# Patient Record
Sex: MALE | Race: BLACK OR AFRICAN AMERICAN | Employment: OTHER | URBAN - METROPOLITAN AREA
[De-identification: names, ages, dates, MRNs, and addresses within clinical notes are randomized per-mention and may not be internally consistent; named-entity substitution may affect disease eponyms.]

---

## 2019-04-16 ENCOUNTER — APPOINTMENT (OUTPATIENT)
Dept: GENERAL RADIOLOGY | Age: 84
End: 2019-04-16
Payer: MEDICARE

## 2019-04-16 ENCOUNTER — HOSPITAL ENCOUNTER (EMERGENCY)
Age: 84
Discharge: HOME | End: 2019-04-16
Payer: MEDICARE

## 2019-04-16 VITALS
RESPIRATION RATE: 18 BRPM | HEIGHT: 69 IN | WEIGHT: 130 LBS | TEMPERATURE: 98.4 F | BODY MASS INDEX: 19.26 KG/M2 | HEART RATE: 96 BPM | SYSTOLIC BLOOD PRESSURE: 116 MMHG | OXYGEN SATURATION: 98 % | DIASTOLIC BLOOD PRESSURE: 75 MMHG

## 2019-04-16 DIAGNOSIS — J18.9 PNEUMONIA DUE TO ORGANISM: Primary | ICD-10-CM

## 2019-04-16 LAB
ALBUMIN SERPL-MCNC: 1.8 G/DL (ref 3.5–4.6)
ALP BLD-CCNC: 65 U/L (ref 35–104)
ALT SERPL-CCNC: 68 U/L (ref 0–41)
ANION GAP SERPL CALCULATED.3IONS-SCNC: 12 MEQ/L (ref 9–15)
APTT: 24.3 SEC (ref 21.6–35.4)
AST SERPL-CCNC: 42 U/L (ref 0–40)
BASE EXCESS ARTERIAL: -3 (ref -3–3)
BASOPHILS ABSOLUTE: 0 K/UL (ref 0–0.2)
BASOPHILS RELATIVE PERCENT: 0.3 %
BILIRUB SERPL-MCNC: 0.7 MG/DL (ref 0.2–0.7)
BUN BLDV-MCNC: 36 MG/DL (ref 8–23)
CALCIUM IONIZED: 1.51 MMOL/L (ref 1.12–1.32)
CALCIUM SERPL-MCNC: 9.5 MG/DL (ref 8.5–9.9)
CHLORIDE BLD-SCNC: 117 MEQ/L (ref 95–107)
CO2: 22 MEQ/L (ref 20–31)
CREAT SERPL-MCNC: 0.76 MG/DL (ref 0.7–1.2)
EKG ATRIAL RATE: 87 BPM
EKG P AXIS: 82 DEGREES
EKG P-R INTERVAL: 170 MS
EKG Q-T INTERVAL: 370 MS
EKG QRS DURATION: 70 MS
EKG QTC CALCULATION (BAZETT): 445 MS
EKG R AXIS: -16 DEGREES
EKG T AXIS: -2 DEGREES
EKG VENTRICULAR RATE: 87 BPM
EOSINOPHILS ABSOLUTE: 0 K/UL (ref 0–0.7)
EOSINOPHILS RELATIVE PERCENT: 0.3 %
GFR AFRICAN AMERICAN: >60
GFR AFRICAN AMERICAN: >60
GFR NON-AFRICAN AMERICAN: >60
GFR NON-AFRICAN AMERICAN: >60
GLOBULIN: 7 G/DL (ref 2.3–3.5)
GLUCOSE BLD-MCNC: 117 MG/DL (ref 70–99)
GLUCOSE BLD-MCNC: 137 MG/DL (ref 60–115)
HCO3 ARTERIAL: 21 MMOL/L (ref 21–29)
HCT VFR BLD CALC: 24.9 % (ref 42–52)
HEMOGLOBIN: 7.7 G/DL (ref 14–18)
HEMOGLOBIN: 8.2 GM/DL (ref 13.5–17.5)
INR BLD: 1.2
LACTATE: 1.01 MMOL/L (ref 0.4–2)
LACTIC ACID: 2.8 MMOL/L (ref 0.5–2.2)
LACTIC ACID: 3.1 MMOL/L (ref 0.5–2.2)
LYMPHOCYTES ABSOLUTE: 1.6 K/UL (ref 1–4.8)
LYMPHOCYTES RELATIVE PERCENT: 13.8 %
MCH RBC QN AUTO: 29.7 PG (ref 27–31.3)
MCHC RBC AUTO-ENTMCNC: 30.8 % (ref 33–37)
MCV RBC AUTO: 96.5 FL (ref 80–100)
MONOCYTES ABSOLUTE: 0.9 K/UL (ref 0.2–0.8)
MONOCYTES RELATIVE PERCENT: 8.1 %
NEUTROPHILS ABSOLUTE: 8.9 K/UL (ref 1.4–6.5)
NEUTROPHILS RELATIVE PERCENT: 77.5 %
O2 SAT, ARTERIAL: 93 % (ref 93–100)
PCO2 ARTERIAL: 28 MM HG (ref 35–45)
PDW BLD-RTO: 19.8 % (ref 11.5–14.5)
PERFORMED ON: ABNORMAL
PH ARTERIAL: 7.49 (ref 7.35–7.45)
PLATELET # BLD: 253 K/UL (ref 130–400)
PO2 ARTERIAL: 61 MM HG (ref 75–108)
POC CHLORIDE: 128 MEQ/L (ref 99–110)
POC CREATININE: 0.9 MG/DL (ref 0.8–1.3)
POC HEMATOCRIT: 24 % (ref 41–53)
POC POTASSIUM: 3.6 MEQ/L (ref 3.5–5.1)
POC SAMPLE TYPE: ABNORMAL
POC SODIUM: 160 MEQ/L (ref 136–145)
POTASSIUM SERPL-SCNC: 3.7 MEQ/L (ref 3.4–4.9)
PROTHROMBIN TIME: 12 SEC (ref 9–11.5)
RBC # BLD: 2.58 M/UL (ref 4.7–6.1)
REASON FOR REJECTION: NORMAL
REJECTED TEST: NORMAL
SODIUM BLD-SCNC: 151 MEQ/L (ref 135–144)
TCO2 ARTERIAL: 22 (ref 22–29)
TOTAL CK: 18 U/L (ref 0–190)
TOTAL PROTEIN: 8.8 G/DL (ref 6.3–8)
TROPONIN: <0.01 NG/ML (ref 0–0.01)
WBC # BLD: 11.5 K/UL (ref 4.8–10.8)

## 2019-04-16 PROCEDURE — 71045 X-RAY EXAM CHEST 1 VIEW: CPT

## 2019-04-16 PROCEDURE — 85610 PROTHROMBIN TIME: CPT

## 2019-04-16 PROCEDURE — 85014 HEMATOCRIT: CPT

## 2019-04-16 PROCEDURE — 82803 BLOOD GASES ANY COMBINATION: CPT

## 2019-04-16 PROCEDURE — 82435 ASSAY OF BLOOD CHLORIDE: CPT

## 2019-04-16 PROCEDURE — 84295 ASSAY OF SERUM SODIUM: CPT

## 2019-04-16 PROCEDURE — 87040 BLOOD CULTURE FOR BACTERIA: CPT

## 2019-04-16 PROCEDURE — 82330 ASSAY OF CALCIUM: CPT

## 2019-04-16 PROCEDURE — 2580000003 HC RX 258: Performed by: PHYSICIAN ASSISTANT

## 2019-04-16 PROCEDURE — 94640 AIRWAY INHALATION TREATMENT: CPT

## 2019-04-16 PROCEDURE — 93005 ELECTROCARDIOGRAM TRACING: CPT

## 2019-04-16 PROCEDURE — 80053 COMPREHEN METABOLIC PANEL: CPT

## 2019-04-16 PROCEDURE — 83605 ASSAY OF LACTIC ACID: CPT

## 2019-04-16 PROCEDURE — 82565 ASSAY OF CREATININE: CPT

## 2019-04-16 PROCEDURE — 82550 ASSAY OF CK (CPK): CPT

## 2019-04-16 PROCEDURE — 99284 EMERGENCY DEPT VISIT MOD MDM: CPT

## 2019-04-16 PROCEDURE — 6370000000 HC RX 637 (ALT 250 FOR IP): Performed by: PHYSICIAN ASSISTANT

## 2019-04-16 PROCEDURE — 85025 COMPLETE CBC W/AUTO DIFF WBC: CPT

## 2019-04-16 PROCEDURE — 84132 ASSAY OF SERUM POTASSIUM: CPT

## 2019-04-16 PROCEDURE — 84484 ASSAY OF TROPONIN QUANT: CPT

## 2019-04-16 PROCEDURE — 36415 COLL VENOUS BLD VENIPUNCTURE: CPT

## 2019-04-16 PROCEDURE — 85730 THROMBOPLASTIN TIME PARTIAL: CPT

## 2019-04-16 PROCEDURE — 36600 WITHDRAWAL OF ARTERIAL BLOOD: CPT

## 2019-04-16 RX ORDER — FERROUS SULFATE 325(65) MG
325 TABLET ORAL 2 TIMES DAILY
Status: ON HOLD | COMMUNITY
End: 2019-08-02 | Stop reason: HOSPADM

## 2019-04-16 RX ORDER — BRIMONIDINE TARTRATE 2 MG/ML
1 SOLUTION/ DROPS OPHTHALMIC 2 TIMES DAILY
COMMUNITY

## 2019-04-16 RX ORDER — ACETAMINOPHEN 325 MG/1
650 TABLET ORAL EVERY 4 HOURS PRN
COMMUNITY
End: 2019-08-02

## 2019-04-16 RX ORDER — DORZOLAMIDE HYDROCHLORIDE AND TIMOLOL MALEATE 20; 5 MG/ML; MG/ML
1 SOLUTION/ DROPS OPHTHALMIC 2 TIMES DAILY
COMMUNITY

## 2019-04-16 RX ORDER — LEVOTHYROXINE SODIUM 88 UG/1
88 TABLET ORAL DAILY
COMMUNITY

## 2019-04-16 RX ORDER — AMLODIPINE BESYLATE 10 MG/1
10 TABLET ORAL DAILY
COMMUNITY

## 2019-04-16 RX ORDER — IPRATROPIUM BROMIDE AND ALBUTEROL SULFATE 2.5; .5 MG/3ML; MG/3ML
1 SOLUTION RESPIRATORY (INHALATION)
Status: DISCONTINUED | OUTPATIENT
Start: 2019-04-16 | End: 2019-04-16 | Stop reason: HOSPADM

## 2019-04-16 RX ORDER — CLONIDINE 0.1 MG/24H
1 PATCH, EXTENDED RELEASE TRANSDERMAL WEEKLY
Status: ON HOLD | COMMUNITY
End: 2019-08-02

## 2019-04-16 RX ORDER — 0.9 % SODIUM CHLORIDE 0.9 %
1000 INTRAVENOUS SOLUTION INTRAVENOUS ONCE
Status: COMPLETED | OUTPATIENT
Start: 2019-04-16 | End: 2019-04-16

## 2019-04-16 RX ORDER — LOSARTAN POTASSIUM 100 MG/1
100 TABLET ORAL DAILY
Status: ON HOLD | COMMUNITY
End: 2019-08-02 | Stop reason: HOSPADM

## 2019-04-16 RX ORDER — IPRATROPIUM BROMIDE AND ALBUTEROL SULFATE 2.5; .5 MG/3ML; MG/3ML
1 SOLUTION RESPIRATORY (INHALATION) 3 TIMES DAILY PRN
Status: ON HOLD | COMMUNITY
End: 2019-08-02 | Stop reason: HOSPADM

## 2019-04-16 RX ORDER — LATANOPROST 50 UG/ML
1 SOLUTION/ DROPS OPHTHALMIC NIGHTLY
COMMUNITY

## 2019-04-16 RX ORDER — MIRTAZAPINE 15 MG/1
15 TABLET, FILM COATED ORAL NIGHTLY
COMMUNITY

## 2019-04-16 RX ORDER — DONEPEZIL HYDROCHLORIDE 10 MG/1
10 TABLET, FILM COATED ORAL NIGHTLY
COMMUNITY

## 2019-04-16 RX ADMIN — IPRATROPIUM BROMIDE AND ALBUTEROL SULFATE 1 AMPULE: .5; 3 SOLUTION RESPIRATORY (INHALATION) at 10:48

## 2019-04-16 RX ADMIN — SODIUM CHLORIDE 1000 ML: 9 INJECTION, SOLUTION INTRAVENOUS at 10:41

## 2019-04-16 SDOH — HEALTH STABILITY: MENTAL HEALTH: HOW OFTEN DO YOU HAVE A DRINK CONTAINING ALCOHOL?: NEVER

## 2019-04-16 ASSESSMENT — ENCOUNTER SYMPTOMS
NAUSEA: 0
COUGH: 1
SORE THROAT: 0
BACK PAIN: 0
CONSTIPATION: 0
EYE DISCHARGE: 0
VOMITING: 0
STRIDOR: 0
ABDOMINAL PAIN: 0
SHORTNESS OF BREATH: 0
ABDOMINAL DISTENTION: 0
WHEEZING: 0
COLOR CHANGE: 0
RHINORRHEA: 0
DIARRHEA: 0

## 2019-04-16 NOTE — ED PROVIDER NOTES
[facility] ED  eMERGENCY dEPARTMENT eNCOUnter      Pt Name: Venice Ross  MRN: [id number]  [place] 3/2/1928  Date of evaluation: 4/16/2019  Provider: Zenaida Thomas PA-C    CHIEF COMPLAINT       Chief Complaint   Patient presents with    Other     facility states o2 sat 66% 100% on r/a on arrival         HISTORY OF PRESENT ILLNESS   (Location/Symptom, Timing/Onset,Context/Setting, Quality, Duration, Modifying Factors, Severity)  Note limiting factors. Venice Ross is a 80 y.o. male who presents to the emergency department with a complaint of low saturations according to nursing home staff. Per staff. Patient's recently been treated for pneumonia they state over last 2 days his saturations up and running between 60 and 75%. Staff states patient has remained alert and oriented without any acute distress otherwise. He has no fevers no nausea vomiting dizziness denies any chest pain. He does have a past history of dementia so difficult to obtain accurate information from patient. On arrival to the emergency department patient appears in no acute distress there is no signs respiratory distress. HPI    NursingNotes were reviewed. REVIEW OF SYSTEMS    (2-9 systems for level 4, 10 or more for level 5)     Review of Systems   Constitutional: Negative for activity change, appetite change, fatigue and fever. HENT: Negative for congestion, ear discharge, ear pain, nosebleeds, rhinorrhea and sore throat. Eyes: Negative for discharge. Respiratory: Positive for cough. Negative for shortness of breath, wheezing and stridor. Low saturations level   Cardiovascular: Negative for chest pain, palpitations and leg swelling. Gastrointestinal: Negative for abdominal distention, abdominal pain, constipation, diarrhea, nausea and vomiting. Genitourinary: Negative for difficulty urinating and dysuria. Musculoskeletal: Negative for arthralgias and back pain.    Skin: Negative for color change, pallor, rash and wound. Neurological: Negative for dizziness, tremors, syncope, weakness, numbness and headaches. Psychiatric/Behavioral: Negative for agitation and confusion. Except as noted above the remainder of the review of systems was reviewed and negative. PAST MEDICAL HISTORY   History reviewed. No pertinent past medical history. SURGICALHISTORY     History reviewed. No pertinent surgical history. CURRENT MEDICATIONS       Previous Medications    ACETAMINOPHEN (TYLENOL) 325 MG TABLET    Take 650 mg by mouth every 4 hours as needed for Pain    ACIDOPHILUS LACTOBACILLUS PO    Take 1 capsule by mouth 2 times daily    AMLODIPINE (NORVASC) 10 MG TABLET    Take 10 mg by mouth daily    BRIMONIDINE (ALPHAGAN) 0.2 % OPHTHALMIC SOLUTION    Place 1 drop into the left eye 2 times daily    CLONIDINE (CATAPRES) 0.1 MG/24HR PTWK    Place 1 patch onto the skin once a week Indications: every thursday    DONEPEZIL (ARICEPT) 10 MG TABLET    Take 10 mg by mouth nightly    DORZOLAMIDE-TIMOLOL (COSOPT) 22.3-6.8 MG/ML OPHTHALMIC SOLUTION    Place 1 drop into the left eye 2 times daily    FERROUS SULFATE 325 (65 FE) MG TABLET    Take 325 mg by mouth 2 times daily    IPRATROPIUM-ALBUTEROL (DUONEB) 0.5-2.5 (3) MG/3ML SOLN NEBULIZER SOLUTION    Inhale 1 vial into the lungs 3 times daily as needed for Shortness of Breath (wheezing)    LATANOPROST (XALATAN) 0.005 % OPHTHALMIC SOLUTION    1 drop nightly    LEVOTHYROXINE (SYNTHROID) 88 MCG TABLET    Take 88 mcg by mouth Daily    LOSARTAN (COZAAR) 100 MG TABLET    Take 100 mg by mouth daily    MIRTAZAPINE (REMERON) 15 MG TABLET    Take 15 mg by mouth nightly    RANITIDINE HCL (RANITIDINE 150 MAX STRENGTH PO)    Take 150 mg by mouth 2 times daily    VITAMIN D (CHOLECALCIFEROL) 1000 UNIT TABS TABLET    Take 1,000 Units by mouth daily       ALLERGIES     Patient has no known allergies. FAMILY HISTORY     History reviewed. No pertinent family history. SOCIAL HISTORY       Social History     Socioeconomic History    Marital status:      Spouse name: None    Number of children: None    Years of education: None    Highest education level: None   Occupational History    None   Social Needs    Financial resource strain: None    Food insecurity:     Worry: None     Inability: None    Transportation needs:     Medical: None     Non-medical: None   Tobacco Use    Smoking status: Unknown If Ever Smoked    Smokeless tobacco: Never Used   Substance and Sexual Activity    Alcohol use: Not Currently     Frequency: Never    Drug use: Never    Sexual activity: None   Lifestyle    Physical activity:     Days per week: None     Minutes per session: None    Stress: None   Relationships    Social connections:     Talks on phone: None     Gets together: None     Attends Cheondoism service: None     Active member of club or organization: None     Attends meetings of clubs or organizations: None     Relationship status: None    Intimate partner violence:     Fear of current or ex partner: None     Emotionally abused: None     Physically abused: None     Forced sexual activity: None   Other Topics Concern    None   Social History Narrative    None       SCREENINGS      @FLOW([id number])@      PHYSICAL EXAM    (up to 7 for level 4, 8 or more for level 5)     ED Triage Vitals   BP Temp Temp src Pulse Resp SpO2 Height Weight   -- -- -- -- -- -- -- --       Physical Exam   Constitutional: He appears well-developed and well-nourished. HENT:   Head: Normocephalic. Eyes: Pupils are equal, round, and reactive to light. EOM are normal.   Neck: Normal range of motion. Neck supple. No JVD present. No tracheal deviation present. Cardiovascular: Normal rate. Pulmonary/Chest: Effort normal and breath sounds normal. No respiratory distress. He has no wheezes. He has no rales. He exhibits no tenderness.    Patient displays no acute respiratory distress there is no 19.8 (*)     Neutrophils # 8.9 (*)     Monocytes # 0.9 (*)     All other components within normal limits   LACTIC ACID, PLASMA - Abnormal; Notable for the following components:    Lactic Acid 3.1 (*)     All other components within normal limits    Narrative:     Jan Post  ED tel. [phone],  Lactic acid results called to and read back by Sunita Adams, 04/16/2019 10:05,  by Vicente Ruiz   LACTIC ACID, PLASMA - Abnormal; Notable for the following components:    Lactic Acid 2.8 (*)     All other components within normal limits   PROTIME-INR - Abnormal; Notable for the following components:    Protime 12.0 (*)     All other components within normal limits   POCT ARTERIAL - Abnormal; Notable for the following components:    POC Sodium 160 (*)     POC Chloride 128 (*)     POC Glucose 137 (*)     Calcium, Ion 1.51 (*)     pH, Arterial 7.485 (*)     pCO2, Arterial 28 (*)     pO2, Arterial 61 (*)     O2 Sat, Arterial 93 (*)     POC Hematocrit 24 (*)     Hemoglobin 8.2 (*)     All other components within normal limits    Narrative:     CALL  Bec[facility]il  ED tel. [phone],   CULTURE BLOOD #1   CULTURE BLOOD #2   TROPONIN   CK   SPECIMEN REJECTION   APTT       All other labs were within normal range or not returned as of this dictation.     EMERGENCY DEPARTMENT COURSE and DIFFERENTIAL DIAGNOSIS/MDM:   Vitals:    Vitals:    04/16/19 1006 04/16/19 1010 04/16/19 1051 04/16/19 1209   BP: 111/72   116/75   Pulse: 87   96   Resp: 18  18 18   Temp:  98.4 °F (36.9 °C)     TempSrc:  Temporal     SpO2: 98%  98% 98%   Weight:       Height:              MDM  Number of Diagnoses or Management Options  Diagnosis management comments: During the patient's ER visit he displays no acute distress lung sounds are diminished but essentially clear in all fields chest x-ray shows some concerns for infiltrates in the right lung bases currently on Rocephin and Zithromax white count is 5.9 thousand, saturations off of oxygen and had maintained between 96 and 100% during his entire visit he displays no acute distress and there is no accessory muscle use. Does not present with a fever and is not tachycardic during his visit. Patient had received IV hydration and a repeat lactic acid level was drawn and came back at 2.8 patient has remained very stable during his visit with saturations maintaining % he displays no acute distress. He will be discharged back to the nursing home and advised to continue administration of his current antibiotics and frequent breathing treatments every 4 hours. Monitor his saturations and return to the emergency department if his symptoms worsen. He is also advised to follow-up with his regular family physician today. CRITICAL CARE TIME   Total Critical Care time was 0 minutes, excluding separately reportableprocedures. There was a high probability of clinicallysignificant/life threatening deterioration in the patient's condition which required my urgent intervention. CONSULTS:  None    PROCEDURES:  Unless otherwise noted below, none     Procedures    FINAL IMPRESSION      1.  Pneumonia due to organism          DISPOSITION/PLAN   DISPOSITION Decision To Discharge 04/16/2019 01:44:05 PM      PATIENT REFERRED TO:  Johann Philip MD  [address] [place] [address]  [phone]    Today        DISCHARGE MEDICATIONS:  New Prescriptions    No medications on file          (Please note that portions of this note were completed with a voice recognition program.  Efforts were made to edit the dictations but occasionally words are mis-transcribed.)    Nomi Honeycutt PA-C (electronically signed)  Attending Emergency Physician         Nomi Honeycutt PA-C  04/16/19 Venu Barron [address] Rebecca Grant PA-C  04/16/19 3939

## 2019-04-16 NOTE — ED TRIAGE NOTES
Per facility, pt was having an o2 sat of 55-61 over the last 2 days pt was 100% on arrival with room air. Pt does not appear in any distress at this time. Pt currently being treated for pneumonia. Pt is a+o x4 msps intact. Pt came with iv from facility. resp in room on arrival for abgs.  Lab called for blood draw

## 2019-04-16 NOTE — ED NOTES
Bed: 09  Expected date: 4/16/19  Expected time: 8:42 AM  Means of arrival: [facility]  Comments:  80 M - Full Code. Evan. SpO2 fluctuating. Pneumonia dx.       Chapin Alicia RN  04/16/19 9619

## 2019-04-16 NOTE — ED NOTES
Per Jelena Cooper in lab PTT not processing. Phleb to redraw.       Filiberto Gamboa RN  04/16/19 9283

## 2019-04-17 ENCOUNTER — HOSPITAL ENCOUNTER (EMERGENCY)
Age: 84
Discharge: HOME | End: 2019-04-17
Attending: EMERGENCY MEDICINE
Payer: MEDICARE

## 2019-04-17 ENCOUNTER — APPOINTMENT (OUTPATIENT)
Dept: GENERAL RADIOLOGY | Age: 84
End: 2019-04-17
Payer: MEDICARE

## 2019-04-17 VITALS
DIASTOLIC BLOOD PRESSURE: 68 MMHG | SYSTOLIC BLOOD PRESSURE: 119 MMHG | RESPIRATION RATE: 16 BRPM | HEART RATE: 89 BPM | TEMPERATURE: 98 F | OXYGEN SATURATION: 98 %

## 2019-04-17 DIAGNOSIS — E87.0 CHRONIC HYPERNATREMIA: ICD-10-CM

## 2019-04-17 DIAGNOSIS — J18.9 PNEUMONIA DUE TO ORGANISM: Primary | ICD-10-CM

## 2019-04-17 LAB
ALBUMIN SERPL-MCNC: 1.4 G/DL (ref 3.5–4.6)
ALP BLD-CCNC: 69 U/L (ref 35–104)
ALT SERPL-CCNC: 84 U/L (ref 0–41)
ANION GAP SERPL CALCULATED.3IONS-SCNC: 12 MEQ/L (ref 9–15)
ANISOCYTOSIS: ABNORMAL
AST SERPL-CCNC: 72 U/L (ref 0–40)
BASOPHILS ABSOLUTE: 0 K/UL (ref 0–0.2)
BASOPHILS RELATIVE PERCENT: 0.1 %
BILIRUB SERPL-MCNC: 0.5 MG/DL (ref 0.2–0.7)
BUN BLDV-MCNC: 33 MG/DL (ref 8–23)
BURR CELLS: ABNORMAL
CALCIUM SERPL-MCNC: 9.2 MG/DL (ref 8.5–9.9)
CHLORIDE BLD-SCNC: 123 MEQ/L (ref 95–107)
CO2: 18 MEQ/L (ref 20–31)
CREAT SERPL-MCNC: 0.86 MG/DL (ref 0.7–1.2)
EOSINOPHILS ABSOLUTE: 0.1 K/UL (ref 0–0.7)
EOSINOPHILS RELATIVE PERCENT: 1 %
GFR AFRICAN AMERICAN: >60
GFR NON-AFRICAN AMERICAN: >60
GLOBULIN: 6.9 G/DL (ref 2.3–3.5)
GLUCOSE BLD-MCNC: 99 MG/DL (ref 70–99)
HCT VFR BLD CALC: 25.7 % (ref 42–52)
HEMOGLOBIN: 7.9 G/DL (ref 14–18)
LYMPHOCYTES ABSOLUTE: 0.6 K/UL (ref 1–4.8)
LYMPHOCYTES RELATIVE PERCENT: 5 %
MACROCYTES: ABNORMAL
MCH RBC QN AUTO: 29.5 PG (ref 27–31.3)
MCHC RBC AUTO-ENTMCNC: 30.9 % (ref 33–37)
MCV RBC AUTO: 95.6 FL (ref 80–100)
METAMYELOCYTES RELATIVE PERCENT: 1 %
MONOCYTES ABSOLUTE: 0.9 K/UL (ref 0.2–0.8)
MONOCYTES RELATIVE PERCENT: 6.7 %
NEUTROPHILS ABSOLUTE: 11.2 K/UL (ref 1.4–6.5)
NEUTROPHILS RELATIVE PERCENT: 87 %
PDW BLD-RTO: 19.6 % (ref 11.5–14.5)
PLATELET # BLD: 244 K/UL (ref 130–400)
POIKILOCYTES: ABNORMAL
POTASSIUM SERPL-SCNC: 3.7 MEQ/L (ref 3.4–4.9)
RBC # BLD: 2.69 M/UL (ref 4.7–6.1)
SMUDGE CELLS: 4.8
SODIUM BLD-SCNC: 153 MEQ/L (ref 135–144)
TOTAL PROTEIN: 8.3 G/DL (ref 6.3–8)
WBC # BLD: 12.7 K/UL (ref 4.8–10.8)

## 2019-04-17 PROCEDURE — 36415 COLL VENOUS BLD VENIPUNCTURE: CPT

## 2019-04-17 PROCEDURE — 85025 COMPLETE CBC W/AUTO DIFF WBC: CPT

## 2019-04-17 PROCEDURE — 71045 X-RAY EXAM CHEST 1 VIEW: CPT

## 2019-04-17 PROCEDURE — 99285 EMERGENCY DEPT VISIT HI MDM: CPT

## 2019-04-17 PROCEDURE — 93010 ELECTROCARDIOGRAM REPORT: CPT | Performed by: INTERNAL MEDICINE

## 2019-04-17 PROCEDURE — 80053 COMPREHEN METABOLIC PANEL: CPT

## 2019-04-17 RX ORDER — 0.9 % SODIUM CHLORIDE 0.9 %
1000 INTRAVENOUS SOLUTION INTRAVENOUS ONCE
Status: DISCONTINUED | OUTPATIENT
Start: 2019-04-17 | End: 2019-04-17

## 2019-04-17 NOTE — ED NOTES
Pt resting in bed comfortably with no questions or concerns at this time.       Oral Daugherty RN  04/17/19 7313

## 2019-04-17 NOTE — ED NOTES
Patient report back to [address] [facility] called by [place].       Anmol Marquez RN  04/17/19 5353

## 2019-04-17 NOTE — ED NOTES
Patient given a glass of water at this time. Patient tolerating thin liquids as ordered at nursing home. Patient in no distress at this time.       Prashanth Rodriguez RN  04/17/19 8120

## 2019-04-17 NOTE — PROGRESS NOTES
Received report from [facility]. Report has been called to [facility] pr Jana Khan RN. Patient remains on the monitor at this time.

## 2019-04-17 NOTE — ED PROVIDER NOTES
Talks on phone: None     Gets together: None     Attends Nondenominational service: None     Active member of club or organization: None     Attends meetings of clubs or organizations: None     Relationship status: None    Intimate partner violence:     Fear of current or ex partner: None     Emotionally abused: None     Physically abused: None     Forced sexual activity: None   Other Topics Concern    None   Social History Narrative    None       SCREENINGS      @FLOW([id number])@      PHYSICAL EXAM    (up to 7 for level 4, 8 or more for level 5)     ED Triage Vitals [04/17/19 0225]   BP Temp Temp src Pulse Resp SpO2 Height Weight   122/80 98.5 °F (36.9 °C) -- 89 15 96 % -- --       Physical Exam   Constitutional: He is oriented to person, place, and time. He appears well-developed. No distress. HENT:   Head: Normocephalic. Nose: Nose normal.   Eyes: Pupils are equal, round, and reactive to light. Conjunctivae are normal.   Neck: Normal range of motion. Neck supple. No JVD present. No thyromegaly present. Cardiovascular: Normal rate, regular rhythm, normal heart sounds and intact distal pulses. No murmur heard. Pulmonary/Chest: Effort normal and breath sounds normal. No stridor. He has no wheezes. He has no rales. Abdominal: Soft. Bowel sounds are normal. There is no tenderness. There is no guarding. Musculoskeletal: Normal range of motion. Neurological: He is alert and oriented to person, place, and time. Skin: Skin is warm and dry. Capillary refill takes less than 2 seconds. Psychiatric: Cognition and memory are impaired. He is inattentive. Nursing note and vitals reviewed.       DIAGNOSTIC RESULTS     EKG: All EKG's are interpreted by the Emergency Department Physician who either signs or Co-signsthis chart in the absence of a cardiologist.        RADIOLOGY:   Non-plain filmimages such as CT, Ultrasound and MRI are read by the radiologist. Plain radiographic images are visualized and preliminarily interpreted by the emergency physician with the below findings:        Interpretation per the Radiologist below, if available at the time ofthis note:    XR CHEST PORTABLE   Final Result   THERE IS SOME MEDIASTINAL FULLNESS COULD [address]. THERE ARE BIBASILAR AREAS OF PATCHY TO COALESCENT INFILTRATES IN BOTH BASES LEFT GREATER THAN RIGHT. OVERALL GIVEN THE CONSTELLATION OF FINDINGS CONSIDER CT SCAN THE CHEST TO FURTHER EVALUATE WITH IV CONTRAST               ED BEDSIDE ULTRASOUND:   Performed by ED Physician - none    LABS:  Labs Reviewed   COMPREHENSIVE METABOLIC PANEL - Abnormal; Notable for the following components:       Result Value    Sodium 153 (*)     Chloride 123 (*)     CO2 18 (*)     BUN 33 (*)     Total Protein 8.3 (*)     Alb 1.4 (*)     ALT 84 (*)     AST 72 (*)     Globulin 6.9 (*)     All other components within normal limits   CBC WITH AUTO DIFFERENTIAL - Abnormal; Notable for the following components:    WBC 12.7 (*)     RBC 2.69 (*)     Hemoglobin 7.9 (*)     Hematocrit 25.7 (*)     MCHC 30.9 (*)     RDW 19.6 (*)     Neutrophils # 11.2 (*)     Lymphocytes # 0.6 (*)     Monocytes # 0.9 (*)     All other components within normal limits       All other labs were within normal range or not returned as of this dictation. EMERGENCY DEPARTMENT COURSE and DIFFERENTIAL DIAGNOSIS/MDM:   Vitals:    Vitals:    04/17/19 0317 04/17/19 0323 04/17/19 0419 04/17/19 0608   BP:  115/66 123/81 108/80   Pulse: 81 84 91 87   Resp: 22 16 17 16   Temp:       SpO2: 97% (!) 78% 95% 94%          MDM since labs show a increase in his serum sodium. Looking at the trend this is all generated from lack of drinking free water. The patient has asked to drink water here and is actually had several glasses. The treatment of the hypernatremia is best done with oral rehydration and patient's second tolerated.   Continue current treatment plan for the pneumonia. CONSULTS:  None    PROCEDURES:  Unless otherwise noted below, none     Procedures    FINAL IMPRESSION      1. Pneumonia due to organism    2.  Chronic hypernatremia          DISPOSITION/PLAN   DISPOSITION Decision To Discharge 04/17/2019 06:34:03 AM      PATIENT REFERRED TO:  Fab Brownlee MD  [address] [place] [address]  [phone]    In 2 days        DISCHARGE MEDICATIONS:  New Prescriptions    No medications on file          (Please note that portions of this note were completed with a voice recognition program.  Efforts were made to edit the dictations but occasionally words are mis-transcribed.)    Sheela Enamorado MD (electronically signed)  Attending Emergency Physician          Sheela Enamorado MD  04/17/19 0636       Sheela Enamorado MD  04/17/19 9889

## 2019-04-17 NOTE — ED NOTES
Bed: 06  Expected date: 4/17/19  Expected time: 1:59 AM  Means of arrival:   Comments:  80 m, [address], Resp Distress, 83% on NRB on [facility]  04/17/19 9459

## 2019-04-21 LAB
BLOOD CULTURE, ROUTINE: NORMAL
CULTURE, BLOOD 2: NORMAL

## 2019-08-02 ENCOUNTER — APPOINTMENT (OUTPATIENT)
Dept: GENERAL RADIOLOGY | Age: 84
DRG: 379 | End: 2019-08-02
Payer: MEDICARE

## 2019-08-02 ENCOUNTER — HOSPITAL ENCOUNTER (INPATIENT)
Age: 84
LOS: 1 days | Discharge: NURSING, REHAB OR LONG-TERM CARE | DRG: 379 | End: 2019-08-02
Attending: INTERNAL MEDICINE | Admitting: INTERNAL MEDICINE
Payer: MEDICARE

## 2019-08-02 ENCOUNTER — APPOINTMENT (OUTPATIENT)
Dept: CT IMAGING | Age: 84
DRG: 379 | End: 2019-08-02
Payer: MEDICARE

## 2019-08-02 VITALS
BODY MASS INDEX: 22.53 KG/M2 | OXYGEN SATURATION: 98 % | HEIGHT: 69 IN | HEART RATE: 98 BPM | SYSTOLIC BLOOD PRESSURE: 159 MMHG | RESPIRATION RATE: 18 BRPM | TEMPERATURE: 98.1 F | WEIGHT: 152.12 LBS | DIASTOLIC BLOOD PRESSURE: 53 MMHG

## 2019-08-02 DIAGNOSIS — R79.89 ELEVATED LACTIC ACID LEVEL: ICD-10-CM

## 2019-08-02 DIAGNOSIS — E87.6 HYPOKALEMIA: ICD-10-CM

## 2019-08-02 DIAGNOSIS — K92.0 HEMATEMESIS, PRESENCE OF NAUSEA NOT SPECIFIED: Primary | ICD-10-CM

## 2019-08-02 LAB
ABO/RH: NORMAL
ABO/RH: NORMAL
ALBUMIN SERPL-MCNC: 2.5 G/DL (ref 3.5–4.6)
ALP BLD-CCNC: 51 U/L (ref 35–104)
ALT SERPL-CCNC: 11 U/L (ref 0–41)
ANION GAP SERPL CALCULATED.3IONS-SCNC: 13 MEQ/L (ref 9–15)
ANTIBODY SCREEN: NORMAL
APTT: 32.9 SEC (ref 24.4–36.8)
AST SERPL-CCNC: 20 U/L (ref 0–40)
BASOPHILS ABSOLUTE: 0 K/UL (ref 0–0.2)
BASOPHILS RELATIVE PERCENT: 0.3 %
BILIRUB SERPL-MCNC: 0.7 MG/DL (ref 0.2–0.7)
BUN BLDV-MCNC: 33 MG/DL (ref 8–23)
CALCIUM SERPL-MCNC: 10.7 MG/DL (ref 8.5–9.9)
CHLORIDE BLD-SCNC: 105 MEQ/L (ref 95–107)
CO2: 23 MEQ/L (ref 20–31)
CREAT SERPL-MCNC: 1.12 MG/DL (ref 0.7–1.2)
EKG ATRIAL RATE: 83 BPM
EKG P AXIS: 58 DEGREES
EKG P-R INTERVAL: 190 MS
EKG Q-T INTERVAL: 304 MS
EKG QRS DURATION: 78 MS
EKG QTC CALCULATION (BAZETT): 357 MS
EKG R AXIS: -12 DEGREES
EKG T AXIS: 88 DEGREES
EKG VENTRICULAR RATE: 83 BPM
EOSINOPHILS ABSOLUTE: 0 K/UL (ref 0–0.7)
EOSINOPHILS RELATIVE PERCENT: 0 %
GFR AFRICAN AMERICAN: >60
GFR NON-AFRICAN AMERICAN: >60
GLOBULIN: 9.2 G/DL (ref 2.3–3.5)
GLUCOSE BLD-MCNC: 159 MG/DL (ref 70–99)
HCT VFR BLD CALC: 24.6 % (ref 42–52)
HCT VFR BLD CALC: 27.1 % (ref 42–52)
HEMOGLOBIN: 8.6 G/DL (ref 14–18)
HEMOGLOBIN: 9.2 G/DL (ref 14–18)
INR BLD: 1.3
LACTIC ACID: 3.2 MMOL/L (ref 0.5–2.2)
LIPASE: 30 U/L (ref 12–95)
LYMPHOCYTES ABSOLUTE: 3 K/UL (ref 1–4.8)
LYMPHOCYTES RELATIVE PERCENT: 21.5 %
MCH RBC QN AUTO: 29.7 PG (ref 27–31.3)
MCHC RBC AUTO-ENTMCNC: 34.8 % (ref 33–37)
MCV RBC AUTO: 85.4 FL (ref 80–100)
MONOCYTES ABSOLUTE: 0.7 K/UL (ref 0.2–0.8)
MONOCYTES RELATIVE PERCENT: 4.7 %
NEUTROPHILS ABSOLUTE: 10.4 K/UL (ref 1.4–6.5)
NEUTROPHILS RELATIVE PERCENT: 73.5 %
PDW BLD-RTO: 20.1 % (ref 11.5–14.5)
PLATELET # BLD: 186 K/UL (ref 130–400)
POC CREATININE WHOLE BLOOD: 1.1
POTASSIUM SERPL-SCNC: 3.1 MEQ/L (ref 3.4–4.9)
PROTHROMBIN TIME: 16.8 SEC (ref 12.3–14.9)
RBC # BLD: 2.88 M/UL (ref 4.7–6.1)
SODIUM BLD-SCNC: 141 MEQ/L (ref 135–144)
TOTAL PROTEIN: 11.7 G/DL (ref 6.3–8)
WBC # BLD: 14.2 K/UL (ref 4.8–10.8)

## 2019-08-02 PROCEDURE — 36415 COLL VENOUS BLD VENIPUNCTURE: CPT

## 2019-08-02 PROCEDURE — 83605 ASSAY OF LACTIC ACID: CPT

## 2019-08-02 PROCEDURE — 70450 CT HEAD/BRAIN W/O DYE: CPT

## 2019-08-02 PROCEDURE — 93005 ELECTROCARDIOGRAM TRACING: CPT | Performed by: PERSONAL EMERGENCY RESPONSE ATTENDANT

## 2019-08-02 PROCEDURE — 86900 BLOOD TYPING SEROLOGIC ABO: CPT

## 2019-08-02 PROCEDURE — 80053 COMPREHEN METABOLIC PANEL: CPT

## 2019-08-02 PROCEDURE — 85014 HEMATOCRIT: CPT

## 2019-08-02 PROCEDURE — 1210000000 HC MED SURG R&B

## 2019-08-02 PROCEDURE — 85730 THROMBOPLASTIN TIME PARTIAL: CPT

## 2019-08-02 PROCEDURE — 86850 RBC ANTIBODY SCREEN: CPT

## 2019-08-02 PROCEDURE — 6360000002 HC RX W HCPCS: Performed by: PERSONAL EMERGENCY RESPONSE ATTENDANT

## 2019-08-02 PROCEDURE — 83690 ASSAY OF LIPASE: CPT

## 2019-08-02 PROCEDURE — 2580000003 HC RX 258: Performed by: PERSONAL EMERGENCY RESPONSE ATTENDANT

## 2019-08-02 PROCEDURE — 72125 CT NECK SPINE W/O DYE: CPT

## 2019-08-02 PROCEDURE — 6360000004 HC RX CONTRAST MEDICATION: Performed by: PERSONAL EMERGENCY RESPONSE ATTENDANT

## 2019-08-02 PROCEDURE — 85610 PROTHROMBIN TIME: CPT

## 2019-08-02 PROCEDURE — 96365 THER/PROPH/DIAG IV INF INIT: CPT

## 2019-08-02 PROCEDURE — 85018 HEMOGLOBIN: CPT

## 2019-08-02 PROCEDURE — 99285 EMERGENCY DEPT VISIT HI MDM: CPT

## 2019-08-02 PROCEDURE — 86901 BLOOD TYPING SEROLOGIC RH(D): CPT

## 2019-08-02 PROCEDURE — 96361 HYDRATE IV INFUSION ADD-ON: CPT

## 2019-08-02 PROCEDURE — 96375 TX/PRO/DX INJ NEW DRUG ADDON: CPT

## 2019-08-02 PROCEDURE — 93010 ELECTROCARDIOGRAM REPORT: CPT | Performed by: INTERNAL MEDICINE

## 2019-08-02 PROCEDURE — 85025 COMPLETE CBC W/AUTO DIFF WBC: CPT

## 2019-08-02 PROCEDURE — 74177 CT ABD & PELVIS W/CONTRAST: CPT

## 2019-08-02 PROCEDURE — C9113 INJ PANTOPRAZOLE SODIUM, VIA: HCPCS | Performed by: PERSONAL EMERGENCY RESPONSE ATTENDANT

## 2019-08-02 PROCEDURE — 71045 X-RAY EXAM CHEST 1 VIEW: CPT

## 2019-08-02 RX ORDER — POTASSIUM CHLORIDE 7.45 MG/ML
10 INJECTION INTRAVENOUS ONCE
Status: COMPLETED | OUTPATIENT
Start: 2019-08-02 | End: 2019-08-02

## 2019-08-02 RX ORDER — BRIMONIDINE TARTRATE 2 MG/ML
1 SOLUTION/ DROPS OPHTHALMIC 2 TIMES DAILY
Status: DISCONTINUED | OUTPATIENT
Start: 2019-08-02 | End: 2019-08-02 | Stop reason: HOSPADM

## 2019-08-02 RX ORDER — PANTOPRAZOLE SODIUM 40 MG/10ML
80 INJECTION, POWDER, LYOPHILIZED, FOR SOLUTION INTRAVENOUS ONCE
Status: COMPLETED | OUTPATIENT
Start: 2019-08-02 | End: 2019-08-02

## 2019-08-02 RX ORDER — MIRTAZAPINE 15 MG/1
15 TABLET, FILM COATED ORAL NIGHTLY
Status: DISCONTINUED | OUTPATIENT
Start: 2019-08-02 | End: 2019-08-02 | Stop reason: HOSPADM

## 2019-08-02 RX ORDER — DORZOLAMIDE HYDROCHLORIDE AND TIMOLOL MALEATE 20; 5 MG/ML; MG/ML
1 SOLUTION/ DROPS OPHTHALMIC 2 TIMES DAILY
Status: DISCONTINUED | OUTPATIENT
Start: 2019-08-02 | End: 2019-08-02 | Stop reason: CLARIF

## 2019-08-02 RX ORDER — TIMOLOL MALEATE 5 MG/ML
1 SOLUTION/ DROPS OPHTHALMIC 2 TIMES DAILY
Status: DISCONTINUED | OUTPATIENT
Start: 2019-08-02 | End: 2019-08-02 | Stop reason: HOSPADM

## 2019-08-02 RX ORDER — AMLODIPINE BESYLATE 10 MG/1
10 TABLET ORAL DAILY
Status: DISCONTINUED | OUTPATIENT
Start: 2019-08-02 | End: 2019-08-02 | Stop reason: HOSPADM

## 2019-08-02 RX ORDER — PANTOPRAZOLE SODIUM 40 MG/1
40 TABLET, DELAYED RELEASE ORAL DAILY
DISCHARGE
Start: 2019-08-02

## 2019-08-02 RX ORDER — L. ACIDOPHILUS/L.BULGARICUS 1MM CELL
1 TABLET ORAL 2 TIMES DAILY
Status: DISCONTINUED | OUTPATIENT
Start: 2019-08-02 | End: 2019-08-02 | Stop reason: HOSPADM

## 2019-08-02 RX ORDER — DORZOLAMIDE HCL 20 MG/ML
1 SOLUTION/ DROPS OPHTHALMIC 2 TIMES DAILY
Status: DISCONTINUED | OUTPATIENT
Start: 2019-08-02 | End: 2019-08-02 | Stop reason: HOSPADM

## 2019-08-02 RX ORDER — 0.9 % SODIUM CHLORIDE 0.9 %
1000 INTRAVENOUS SOLUTION INTRAVENOUS ONCE
Status: COMPLETED | OUTPATIENT
Start: 2019-08-02 | End: 2019-08-02

## 2019-08-02 RX ORDER — LEVOTHYROXINE SODIUM 88 UG/1
88 TABLET ORAL DAILY
Status: DISCONTINUED | OUTPATIENT
Start: 2019-08-02 | End: 2019-08-02 | Stop reason: HOSPADM

## 2019-08-02 RX ORDER — DONEPEZIL HYDROCHLORIDE 10 MG/1
10 TABLET, FILM COATED ORAL NIGHTLY
Status: DISCONTINUED | OUTPATIENT
Start: 2019-08-02 | End: 2019-08-02 | Stop reason: HOSPADM

## 2019-08-02 RX ORDER — 0.9 % SODIUM CHLORIDE 0.9 %
10 VIAL (ML) INJECTION EVERY 12 HOURS
Status: DISCONTINUED | OUTPATIENT
Start: 2019-08-02 | End: 2019-08-02 | Stop reason: HOSPADM

## 2019-08-02 RX ORDER — ONDANSETRON 2 MG/ML
4 INJECTION INTRAMUSCULAR; INTRAVENOUS ONCE
Status: COMPLETED | OUTPATIENT
Start: 2019-08-02 | End: 2019-08-02

## 2019-08-02 RX ORDER — 0.9 % SODIUM CHLORIDE 0.9 %
10 VIAL (ML) INJECTION DAILY
Status: DISCONTINUED | OUTPATIENT
Start: 2019-08-02 | End: 2019-08-02 | Stop reason: HOSPADM

## 2019-08-02 RX ORDER — LATANOPROST 50 UG/ML
1 SOLUTION/ DROPS OPHTHALMIC NIGHTLY
Status: DISCONTINUED | OUTPATIENT
Start: 2019-08-02 | End: 2019-08-02 | Stop reason: HOSPADM

## 2019-08-02 RX ORDER — BACITRACIN 500 [USP'U]/G
OINTMENT TOPICAL PRN
COMMUNITY

## 2019-08-02 RX ORDER — RANITIDINE 150 MG/1
150 TABLET ORAL NIGHTLY
Status: ON HOLD | COMMUNITY
End: 2019-08-02 | Stop reason: HOSPADM

## 2019-08-02 RX ORDER — ACETAMINOPHEN 325 MG/1
650 TABLET ORAL EVERY 6 HOURS PRN
Status: DISCONTINUED | OUTPATIENT
Start: 2019-08-02 | End: 2019-08-02 | Stop reason: HOSPADM

## 2019-08-02 RX ORDER — ACETAMINOPHEN 325 MG/1
650 TABLET ORAL EVERY 6 HOURS PRN
COMMUNITY

## 2019-08-02 RX ORDER — PANTOPRAZOLE SODIUM 40 MG/10ML
40 INJECTION, POWDER, LYOPHILIZED, FOR SOLUTION INTRAVENOUS EVERY 12 HOURS
Status: DISCONTINUED | OUTPATIENT
Start: 2019-08-02 | End: 2019-08-02 | Stop reason: HOSPADM

## 2019-08-02 RX ORDER — ONDANSETRON 2 MG/ML
4 INJECTION INTRAMUSCULAR; INTRAVENOUS EVERY 6 HOURS PRN
Status: DISCONTINUED | OUTPATIENT
Start: 2019-08-02 | End: 2019-08-02 | Stop reason: HOSPADM

## 2019-08-02 RX ADMIN — IOPAMIDOL 100 ML: 612 INJECTION, SOLUTION INTRAVENOUS at 04:15

## 2019-08-02 RX ADMIN — ONDANSETRON 4 MG: 2 INJECTION INTRAMUSCULAR; INTRAVENOUS at 03:31

## 2019-08-02 RX ADMIN — SODIUM CHLORIDE 1000 ML: 9 INJECTION, SOLUTION INTRAVENOUS at 03:30

## 2019-08-02 RX ADMIN — POTASSIUM CHLORIDE 10 MEQ: 7.46 INJECTION, SOLUTION INTRAVENOUS at 04:29

## 2019-08-02 RX ADMIN — PANTOPRAZOLE SODIUM 80 MG: 40 INJECTION, POWDER, FOR SOLUTION INTRAVENOUS at 03:30

## 2019-08-02 ASSESSMENT — ENCOUNTER SYMPTOMS
VOMITING: 1
SHORTNESS OF BREATH: 0
NAUSEA: 1
BLOOD IN STOOL: 0
ABDOMINAL PAIN: 1
SORE THROAT: 0
COLOR CHANGE: 0
RHINORRHEA: 0
COUGH: 0
DIARRHEA: 0

## 2019-08-02 ASSESSMENT — PAIN SCALES - PAIN ASSESSMENT IN ADVANCED DEMENTIA (PAINAD)
NEGVOCALIZATION: 1
BODYLANGUAGE: 0
CONSOLABILITY: 1
TOTALSCORE: 3
BREATHING: 0
FACIALEXPRESSION: 1

## 2019-08-02 NOTE — ED NOTES
Radiology Dr. Hardik Baltazar had called on regards to CT scan the abdomen pelvis which was completed during patient visit last evening there is concerns for a right renal mass which could be consistent with that of renal cell carcinoma.   This information was passed on to Thierno DODD) of [address] [place] [address] services as patient was admitted into the hospital. 1042 am     Angelbill [place], [place]  08/02/19 1042

## 2019-08-02 NOTE — CARE COORDINATION
Per Corbin Sanders Pt, pt is current with [facility]. Meredith states Diaz Pt will contact [facility] when the pt returns. C3 updated.   Electronically signed by Venus Landaverde on 8/2/19 at 2:28 PM

## 2019-08-02 NOTE — ED TRIAGE NOTES
Pt DNR-CC [facility] [address] [place] [address] [facility]. H/O dementia A&O x 1-2. Vitals stable. Per [facility], pt found in pool of bloody emesis during rounds.

## 2019-08-02 NOTE — H&P
Hospital Medicine  History and Physical    Patient:  Luh Blake  MRN: [id number]    CHIEF COMPLAINT:    Chief Complaint   Patient presents with    Hematemesis     arrived via [address] [facility] from [facility]       History Obtained From:  Patient, EMR  Primary Care Physician: Lachelle Marcelo MD    HISTORY OF PRESENT ILLNESS:   The patient is a 80 y.o. male with PMH of dementia, HTN who came from [address] [place] [address] with hematemesis. Patient has dementia and so unable to provide any history. He didn't know that he is in hospital. He is oriented to self. History obtained from EMR. Per ED notes, he was found laying on the ground in coffee ground emesis. In ED, He had low grade fever of 100. 1. his hemoglobin was 8.6 which is his baseline. Lactate was 3.2. CT C-spine and CT head was negative for acute process. CT A/P showed exophytic mass of the right kidney and cystic area in the head of the pancreas and soft tissue thickening at the GE junction. I got a call from [place] who is the director of nursing at [facility]. He is [facility] and has hospice care. [facility] and [facility] are upset that the patient was sent to hospital. They are requesting the patient to be sent back to the NH. Past Medical History:      Diagnosis Date    Dementia     Hypertension     Hypokalemia     Pneumonia        Past Surgical History:  No past surgical history on file. Medications Prior to Admission:    Prior to Admission medications    Medication Sig Start Date End Date Taking?  Authorizing Provider   ACIDOPHILUS LACTOBACILLUS PO Take 1 capsule by mouth 2 times daily   Yes Historical Provider, MD   donepezil (ARICEPT) 10 MG tablet Take 10 mg by mouth nightly   Yes Historical Provider, MD   brimonidine (ALPHAGAN) 0.2 % ophthalmic solution Place 1 drop into the left eye 2 times daily   Yes Historical Provider, MD   cloNIDine (CATAPRES) 0.1 MG/24HR PTWK Place 1 patch onto the skin once a week Indications: every thursday   Yes Historical Provider, MD   dorzolamide-timolol (COSOPT) 22.3-6.8 MG/ML ophthalmic solution Place 1 drop into the left eye 2 times daily   Yes Historical Provider, MD   latanoprost (XALATAN) 0.005 % ophthalmic solution 1 drop nightly   Yes Historical Provider, MD   mirtazapine (REMERON) 15 MG tablet Take 15 mg by mouth nightly   Yes Historical Provider, MD   amLODIPine (NORVASC) 10 MG tablet Take 10 mg by mouth daily   Yes Historical Provider, MD   raNITIdine HCl (RANITIDINE 150 MAX STRENGTH PO) Take 150 mg by mouth 2 times daily   Yes Historical Provider, MD   levothyroxine (SYNTHROID) 88 MCG tablet Take 88 mcg by mouth Daily   Yes Historical Provider, MD   ipratropium-albuterol (DUONEB) 0.5-2.5 (3) MG/3ML SOLN nebulizer solution Inhale 1 vial into the lungs 3 times daily as needed for Shortness of Breath (wheezing)    Historical Provider, MD   ferrous sulfate 325 (65 Fe) MG tablet Take 325 mg by mouth 2 times daily    Historical Provider, MD   losartan (COZAAR) 100 MG tablet Take 100 mg by mouth daily    Historical Provider, MD   vitamin D (CHOLECALCIFEROL) 1000 UNIT TABS tablet Take 1,000 Units by mouth daily    Historical Provider, MD       Allergies:  Patient has no known allergies. Social History:   TOBACCO:   has an unknown smoking status. He has never used smokeless tobacco.  ETOH:   reports that he drank alcohol. Family History:   No family history on file. REVIEW OF SYSTEMS:  Ten systems reviewed and negative except for stated in HPI    Physical Exam:    Vitals: BP (!) 159/53   Pulse 98   Temp 98.1 °F (36.7 °C)   Resp 18   Ht 5' 9\" (1.753 m)   Wt 152 lb 1.9 oz (69 kg)   SpO2 98%   BMI 22.46 kg/m²   General appearance: alert, confused  Skin: Skin color, texture, turgor normal. No rashes or lesions  HEENT: Head: Normocephalic, no lesions, without obvious abnormality.   Neck: no adenopathy, no carotid bruit, no JVD, supple, symmetrical, trachea midline and thyroid not enlarged, symmetric, no tenderness/mass/nodules  Lungs: clear to auscultation bilaterally  Heart: regular rate and rhythm, S1, S2 normal, no murmur, click, rub or gallop  Abdomen: soft, non-tender; bowel sounds normal; no masses,  no organomegaly  Extremities: extremities normal, atraumatic, no cyanosis or edema  Neurologic: alert, confused, oriented to self only, moving all extremities. Recent Labs     08/02/19 0315   WBC 14.2*   HGB 8.6*        Recent Labs     08/02/19 0315      K 3.1*      CO2 23   BUN 33*   CREATININE 1.12   GLUCOSE 159*   AST 20   ALT 11   BILITOT 0.7   ALKPHOS 51     Troponin T: No results for input(s): TROPONINI in the last 72 hours. ABGs:   Lab Results   Component Value Date    PHART 7.485 04/16/2019    PO2ART 61 04/16/2019    EQN3ZHU 28 04/16/2019     INR:   Recent Labs     08/02/19 0315   INR 1.3     URINALYSIS:No results for input(s): NITRITE, COLORU, PHUR, LABCAST, WBCUA, RBCUA, MUCUS, TRICHOMONAS, YEAST, BACTERIA, CLARITYU, SPECGRAV, LEUKOCYTESUR, UROBILINOGEN, BILIRUBINUR, BLOODU, GLUCOSEU, AMORPHOUS in the last 72 hours. Invalid input(s): Jignesh Ramirez  -----------------------------------------------------------------   Xr Chest (single View Frontal)    Result Date: 8/2/2019  EXAMINATION: CHEST PORTABLE VIEW  CLINICAL HISTORY: Generalized pain after fall COMPARISONS: April 17, 2019  FINDINGS: Single  views of the chest is submitted. The cardiac silhouette is of normal size configuration. The mediastinum is unremarkable. Pulmonary vascular unremarkable. Right sided trachea. Area of atelectasis left lower lobe. No focal infiltrates. No Pneumothoraces. NO ACUTE ACTIVE CARDIOPULMONARY PROCESS    Ct Head Wo Contrast    Result Date: 8/2/2019  CT HEAD WO CONTRAST, CT CERVICAL SPINE WO CONTRAST: 8/2/2019 CLINICAL HISTORY: Found down on the floor. COMPARISON: None available.  TECHNIQUE: ROUTINE All CT scans

## 2019-08-02 NOTE — DISCHARGE SUMMARY
Hospital Medicine Discharge Summary    Sherren Poli  :  3/2/1928  MRN:  [id number]    Admit date:  2019  Discharge date:  2019    Admitting Physician:  Pantera Cotton MD  Primary Care Physician:  Dayanna Cline MD      Discharge Diagnoses: Active Problems:    Hematemesis  Resolved Problems:    * No resolved hospital problems. *      Hospital Course:   Sherren Poli is a 80 y.o. male that was admitted and treated at [facility] for the following medical issues: Active Problems:    Hematemesis  Resolved Problems:    * No resolved hospital problems. *    Patient was sent from NH with hematemesis. hgb was stable. CT A/P showed exophytic mass of the right kidney and cystic area in the head of the pancreas and soft tissue thickening at the GE junction. He could have malignancy or esophagitis or PUD or gastritis causing hematemesis. We were called by [facility] that patient is [address] and under hospice. He was sent to hospital by mistake. They requested the patient to be sent back. Patient was discharged back to [facility]    Patient was seen by the following consultants while admitted to [facility]:   Consults:  IP CONSULT TO GI    Significant Diagnostic Studies:    Xr Chest (single View Frontal)    Result Date: 2019  EXAMINATION: CHEST PORTABLE VIEW  CLINICAL HISTORY: Generalized pain after fall COMPARISONS: 2019  FINDINGS: Single  views of the chest is submitted. The cardiac silhouette is of normal size configuration. The mediastinum is unremarkable. Pulmonary vascular unremarkable. Right sided trachea. Area of atelectasis left lower lobe. No focal infiltrates. No Pneumothoraces.                                                                                    NO ACUTE ACTIVE CARDIOPULMONARY PROCESS    Ct Head Wo Contrast    Result Date: 2019  CT HEAD WO CONTRAST, CT CERVICAL SPINE WO CONTRAST: 2019 CLINICAL HISTORY: Found down on the <6 mm     No routine follow-up               6-8 mm     CT at 3-6 months, then consider CT at 18-24 months                >8 mm     CT at 3-6 months, then  consider CT at 18-24 months Use most suspicious nodule as guide to management. Follow-up intervals may vary according to size and risk (recommendation 2A). High risk**                <6 mm     Optional CT at 12 months               6-8 mm     CT at 3-6 months, then at 18-24 months                >8 mm     CT at 3-6 months, then at 18-24 months Use most suspicious nodule as guide to management. Follow-up intervals may vary according to size and risk (recommendation 2A). B: Subsolid Nodules*      Single                  Ground glass                <6 mm     No routine follow-up              >=6 mm     CT at 6-12 months to confirm persistence, then CT  every 2 years until 5 years In certain suspicious nodules <6 mm, consider follow-up at 2 and 4 years. If solid component(s) or growth develops, consider resection. (Recommendations 3A and 4A). Part solid                <6 mm     No routine follow-up              >=6 mm     CT at 3-6 months to confirm persistence. If unchanged and solid component remains <6 mm, annual CT  should be performed for 5 years. In practice, part-solid nodules cannot be defined as such until >=6 mm, and nodules <6 mm do not usually require follow-up. Persistent part-solid nodules with solid components >=6 mm should be considered highly suspicious (recommendations 4A-4C)      Multiple                <6 mm     CT at 3-6 months. If stable, consider CT at 2 and 4 years. >=6 mm     CT at 3-6 months. Subsequent management based  on the most suspicious nodule(s). Multiple <6 mm pure ground-glass nodules are usually benign, but consider follow-up in selected patients at high risk at 2 and 4 years (recommendation 5A). Note. --These recommendations do not apply to lung cancer screening, patients with

## 2019-08-02 NOTE — ED PROVIDER NOTES
Gets together: Not on file     Attends [employer] service: Not on file     Active member of club or organization: Not on file     Attends meetings of clubs or organizations: Not on file     Relationship status: Not on file    Intimate partner violence:     Fear of current or ex partner: Not on file     Emotionally abused: Not on file     Physically abused: Not on file     Forced sexual activity: Not on file   Other Topics Concern    Not on file   Social History Narrative    Not on file         PHYSICAL EXAM         ED Triage Vitals   BP Temp Temp src Pulse Resp SpO2 Height Weight   08/02/19 0313 08/02/19 0319 -- 08/02/19 0319 08/02/19 0319 08/02/19 0319 08/02/19 0319 08/02/19 0319   129/71 100.1 °F (37.8 °C)  88 16 99 % 5' 9\" (1.753 m) 152 lb 1.9 oz (69 kg)       Physical Exam   Constitutional: He appears well-developed and well-nourished. Fatigued and slightly pallor. Coffee ground emesis on clothing   HENT:   Head: Normocephalic and atraumatic. Mouth/Throat: Oropharynx is clear and moist.   Eyes: Pupils are equal, round, and reactive to light. Conjunctivae and EOM are normal.   Blind in right eye   Neck: Normal range of motion. Neck supple. No tracheal deviation present. Cardiovascular: Normal heart sounds and intact distal pulses. Pulmonary/Chest: Effort normal and breath sounds normal. No stridor. No respiratory distress. Abdominal: Soft. Bowel sounds are normal. He exhibits no distension and no mass. There is no tenderness. There is no rebound and no guarding. Genitourinary: Rectal exam shows guaiac positive stool. Genitourinary Comments: Stool brown with bloody/red streaks, hemoccult has slight positive color change   Musculoskeletal: Normal range of motion. Neurological: He is alert. He has normal reflexes. Alert to self, soft speaking and difficult to understand   Skin: Skin is warm and dry. Capillary refill takes less than 2 seconds. No rash noted.    Psychiatric: He has a normal mood and

## 2019-08-02 NOTE — DISCHARGE INSTR - COC
Update Admission H&P: {CHP DME Changes in QXIXV:[id number]}    PHYSICIAN SIGNATURE:  {Esignature:[id number]}

## 2019-08-03 LAB
GFR AFRICAN AMERICAN: >60
GFR NON-AFRICAN AMERICAN: >60
PERFORMED ON: NORMAL
POC CREATININE: 1.1 MG/DL (ref 0.8–1.3)
POC SAMPLE TYPE: NORMAL